# Patient Record
Sex: MALE | Race: OTHER | HISPANIC OR LATINO | URBAN - METROPOLITAN AREA
[De-identification: names, ages, dates, MRNs, and addresses within clinical notes are randomized per-mention and may not be internally consistent; named-entity substitution may affect disease eponyms.]

---

## 2021-01-20 ENCOUNTER — INPATIENT (INPATIENT)
Facility: HOSPITAL | Age: 71
LOS: 5 days | Discharge: HOME HEALTH | End: 2021-01-26
Attending: INTERNAL MEDICINE | Admitting: INTERNAL MEDICINE
Payer: MEDICAID

## 2021-01-20 VITALS
TEMPERATURE: 99 F | DIASTOLIC BLOOD PRESSURE: 71 MMHG | HEART RATE: 77 BPM | SYSTOLIC BLOOD PRESSURE: 130 MMHG | OXYGEN SATURATION: 96 % | RESPIRATION RATE: 24 BRPM

## 2021-01-20 DIAGNOSIS — Z29.9 ENCOUNTER FOR PROPHYLACTIC MEASURES, UNSPECIFIED: ICD-10-CM

## 2021-01-20 DIAGNOSIS — U07.1 COVID-19: ICD-10-CM

## 2021-01-20 DIAGNOSIS — R03.0 ELEVATED BLOOD-PRESSURE READING, WITHOUT DIAGNOSIS OF HYPERTENSION: ICD-10-CM

## 2021-01-20 DIAGNOSIS — R74.01 ELEVATION OF LEVELS OF LIVER TRANSAMINASE LEVELS: ICD-10-CM

## 2021-01-20 LAB
ALBUMIN SERPL ELPH-MCNC: 3 G/DL — LOW (ref 3.3–5)
ALP SERPL-CCNC: 236 U/L — HIGH (ref 40–120)
ALT FLD-CCNC: 73 U/L — HIGH (ref 4–41)
ANION GAP SERPL CALC-SCNC: 13 MMOL/L — SIGNIFICANT CHANGE UP (ref 7–14)
AST SERPL-CCNC: 58 U/L — HIGH (ref 4–40)
BASOPHILS # BLD AUTO: 0.02 K/UL — SIGNIFICANT CHANGE UP (ref 0–0.2)
BASOPHILS NFR BLD AUTO: 0.2 % — SIGNIFICANT CHANGE UP (ref 0–2)
BILIRUB SERPL-MCNC: 0.6 MG/DL — SIGNIFICANT CHANGE UP (ref 0.2–1.2)
BLOOD GAS VENOUS COMPREHENSIVE RESULT: SIGNIFICANT CHANGE UP
BUN SERPL-MCNC: 16 MG/DL — SIGNIFICANT CHANGE UP (ref 7–23)
CALCIUM SERPL-MCNC: 8.6 MG/DL — SIGNIFICANT CHANGE UP (ref 8.4–10.5)
CHLORIDE SERPL-SCNC: 103 MMOL/L — SIGNIFICANT CHANGE UP (ref 98–107)
CO2 SERPL-SCNC: 21 MMOL/L — LOW (ref 22–31)
CREAT SERPL-MCNC: 1 MG/DL — SIGNIFICANT CHANGE UP (ref 0.5–1.3)
CRP SERPL-MCNC: 184.3 MG/L — HIGH
D DIMER BLD IA.RAPID-MCNC: 757 NG/ML DDU — HIGH
EOSINOPHIL # BLD AUTO: 0.03 K/UL — SIGNIFICANT CHANGE UP (ref 0–0.5)
EOSINOPHIL NFR BLD AUTO: 0.3 % — SIGNIFICANT CHANGE UP (ref 0–6)
FERRITIN SERPL-MCNC: 1076 NG/ML — HIGH (ref 30–400)
GLUCOSE SERPL-MCNC: 111 MG/DL — HIGH (ref 70–99)
HCT VFR BLD CALC: 38.1 % — LOW (ref 39–50)
HGB BLD-MCNC: 12.7 G/DL — LOW (ref 13–17)
IANC: 8.52 K/UL — HIGH (ref 1.5–8.5)
IMM GRANULOCYTES NFR BLD AUTO: 2 % — HIGH (ref 0–1.5)
LYMPHOCYTES # BLD AUTO: 0.66 K/UL — LOW (ref 1–3.3)
LYMPHOCYTES # BLD AUTO: 6.8 % — LOW (ref 13–44)
MCHC RBC-ENTMCNC: 31 PG — SIGNIFICANT CHANGE UP (ref 27–34)
MCHC RBC-ENTMCNC: 33.3 GM/DL — SIGNIFICANT CHANGE UP (ref 32–36)
MCV RBC AUTO: 92.9 FL — SIGNIFICANT CHANGE UP (ref 80–100)
MONOCYTES # BLD AUTO: 0.32 K/UL — SIGNIFICANT CHANGE UP (ref 0–0.9)
MONOCYTES NFR BLD AUTO: 3.3 % — SIGNIFICANT CHANGE UP (ref 2–14)
NEUTROPHILS # BLD AUTO: 8.52 K/UL — HIGH (ref 1.8–7.4)
NEUTROPHILS NFR BLD AUTO: 87.4 % — HIGH (ref 43–77)
NRBC # BLD: 0 /100 WBCS — SIGNIFICANT CHANGE UP
NRBC # FLD: 0 K/UL — SIGNIFICANT CHANGE UP
PLATELET # BLD AUTO: 365 K/UL — SIGNIFICANT CHANGE UP (ref 150–400)
POTASSIUM SERPL-MCNC: 4 MMOL/L — SIGNIFICANT CHANGE UP (ref 3.5–5.3)
POTASSIUM SERPL-SCNC: 4 MMOL/L — SIGNIFICANT CHANGE UP (ref 3.5–5.3)
PROCALCITONIN SERPL-MCNC: 0.45 NG/ML — HIGH (ref 0.02–0.1)
PROT SERPL-MCNC: 6.8 G/DL — SIGNIFICANT CHANGE UP (ref 6–8.3)
RBC # BLD: 4.1 M/UL — LOW (ref 4.2–5.8)
RBC # FLD: 13.9 % — SIGNIFICANT CHANGE UP (ref 10.3–14.5)
SARS-COV-2 RNA SPEC QL NAA+PROBE: DETECTED
SODIUM SERPL-SCNC: 137 MMOL/L — SIGNIFICANT CHANGE UP (ref 135–145)
TROPONIN T, HIGH SENSITIVITY RESULT: 11 NG/L — SIGNIFICANT CHANGE UP
WBC # BLD: 9.74 K/UL — SIGNIFICANT CHANGE UP (ref 3.8–10.5)
WBC # FLD AUTO: 9.74 K/UL — SIGNIFICANT CHANGE UP (ref 3.8–10.5)

## 2021-01-20 PROCEDURE — 99285 EMERGENCY DEPT VISIT HI MDM: CPT

## 2021-01-20 PROCEDURE — 99223 1ST HOSP IP/OBS HIGH 75: CPT

## 2021-01-20 PROCEDURE — 71045 X-RAY EXAM CHEST 1 VIEW: CPT | Mod: 26

## 2021-01-20 RX ORDER — DEXAMETHASONE 0.5 MG/5ML
6 ELIXIR ORAL ONCE
Refills: 0 | Status: COMPLETED | OUTPATIENT
Start: 2021-01-20 | End: 2021-01-20

## 2021-01-20 RX ORDER — REMDESIVIR 5 MG/ML
100 INJECTION INTRAVENOUS EVERY 24 HOURS
Refills: 0 | Status: COMPLETED | OUTPATIENT
Start: 2021-01-21 | End: 2021-01-24

## 2021-01-20 RX ORDER — ACETAMINOPHEN 500 MG
650 TABLET ORAL EVERY 4 HOURS
Refills: 0 | Status: DISCONTINUED | OUTPATIENT
Start: 2021-01-20 | End: 2021-01-26

## 2021-01-20 RX ORDER — ASPIRIN/CALCIUM CARB/MAGNESIUM 324 MG
1 TABLET ORAL
Qty: 0 | Refills: 0 | DISCHARGE

## 2021-01-20 RX ORDER — REMDESIVIR 5 MG/ML
INJECTION INTRAVENOUS
Refills: 0 | Status: COMPLETED | OUTPATIENT
Start: 2021-01-20 | End: 2021-01-24

## 2021-01-20 RX ORDER — ENOXAPARIN SODIUM 100 MG/ML
40 INJECTION SUBCUTANEOUS DAILY
Refills: 0 | Status: DISCONTINUED | OUTPATIENT
Start: 2021-01-20 | End: 2021-01-26

## 2021-01-20 RX ORDER — INFLUENZA VIRUS VACCINE 15; 15; 15; 15 UG/.5ML; UG/.5ML; UG/.5ML; UG/.5ML
0.5 SUSPENSION INTRAMUSCULAR ONCE
Refills: 0 | Status: DISCONTINUED | OUTPATIENT
Start: 2021-01-20 | End: 2021-01-21

## 2021-01-20 RX ORDER — PANTOPRAZOLE SODIUM 20 MG/1
40 TABLET, DELAYED RELEASE ORAL
Refills: 0 | Status: DISCONTINUED | OUTPATIENT
Start: 2021-01-20 | End: 2021-01-26

## 2021-01-20 RX ORDER — REMDESIVIR 5 MG/ML
200 INJECTION INTRAVENOUS EVERY 24 HOURS
Refills: 0 | Status: COMPLETED | OUTPATIENT
Start: 2021-01-20 | End: 2021-01-20

## 2021-01-20 RX ORDER — ASPIRIN/CALCIUM CARB/MAGNESIUM 324 MG
81 TABLET ORAL DAILY
Refills: 0 | Status: DISCONTINUED | OUTPATIENT
Start: 2021-01-20 | End: 2021-01-26

## 2021-01-20 RX ORDER — DEXAMETHASONE 0.5 MG/5ML
6 ELIXIR ORAL DAILY
Refills: 0 | Status: DISCONTINUED | OUTPATIENT
Start: 2021-01-21 | End: 2021-01-26

## 2021-01-20 RX ADMIN — ENOXAPARIN SODIUM 40 MILLIGRAM(S): 100 INJECTION SUBCUTANEOUS at 13:52

## 2021-01-20 RX ADMIN — REMDESIVIR 500 MILLIGRAM(S): 5 INJECTION INTRAVENOUS at 13:53

## 2021-01-20 RX ADMIN — Medication 81 MILLIGRAM(S): at 13:52

## 2021-01-20 RX ADMIN — Medication 6 MILLIGRAM(S): at 02:45

## 2021-01-20 NOTE — H&P ADULT - PROBLEM SELECTOR PLAN 1
- currently 97% on 4L NC  - decadron 6 mg IV daily x 10 days  - Remdesivir  x 5 days  - monitor clinical response, respiratory status  - supplemental O2 prn, wean as tolerated  - tylenol prn

## 2021-01-20 NOTE — H&P ADULT - NSHPREVIEWOFSYSTEMS_GEN_ALL_CORE
REVIEW OF SYSTEMS  General:	  Skin/Breast:	  Ophthalmologic:	  ENMT:	  Respiratory and Thorax:	  Cardiovascular:	  Gastrointestinal:	  Genitourinary:	  Musculoskeletal:	  Neurological:	  Psychiatric:	  Hematology/Lymphatics:	  Endocrine:	  Allergic/Immunologic: REVIEW OF SYSTEMS  General:	denies current fever/chills, +fatigue, +poor appetite (only taking in liquids at home)  Skin/Breast: no rash/lesions	  Ophthalmologic: no blurry vision/eye pain	  ENMT: +dry lips, no trouble swallowing	  Respiratory and Thorax: reports SOB improved after being placed on O2	  Cardiovascular: no chest pain, no lower extremity edema	  Gastrointestinal: no nausea/vomiting, no constipation/diarrhea, reports occasional burning pain in LUQ which he attributes to not eating   Genitourinary: no dysuria/incontinence/hesitancy	  Musculoskeletal: no joint pain or swelling  Neurological: no headache/focal weakness or loss of sensation	  Psychiatric: no issues	  Hematology/Lymphatics: no easy bleeding/bruising	  Endocrine: no diabetes or thyroid disorder	  Allergic/Immunologic: NKDA

## 2021-01-20 NOTE — ED PROVIDER NOTE - CLINICAL SUMMARY MEDICAL DECISION MAKING FREE TEXT BOX
71 yo male with recent COVID-19 presenting for worsening shortness of breath. Will get COVID-19 labs and Reassess. Likely need to be admitted for hypoxia.

## 2021-01-20 NOTE — H&P ADULT - PROBLEM SELECTOR PLAN 2
- monitor LFTs on Remdesivir  - if having abdominal pain, can consider RUQ sono for further eval - monitor LFTs on Remdesivir  - if having abdominal pain, can consider RUQ sono for further eval given elevated alk phos (though currently denies RUQ tenderness on exam)

## 2021-01-20 NOTE — ED ADULT NURSE NOTE - CHIEF COMPLAINT QUOTE
Pt c/o diagnosed with covid approx 10 days ago, still feeling SOB and O2 sat at home was in the 80's.  Pt 91% on RA, placed on 3L O2 via NC and now O2 sat is 96%.  Pt states feels better with O2.  Denies any chest pain/cough presently.  Denies any PMHx.    Addendum 0200:  Please call daughter for any procedures that need to be done as she is healthcare proxy.

## 2021-01-20 NOTE — H&P ADULT - NSHPSOCIALHISTORY_GEN_ALL_CORE
Lives with daughter and family, no issues with ambulation  Social alcohol use  Denies tobacco Lives with daughter and family, no issues with ambulation  Stays active, says he bicycles and does "gymnastics"  Social alcohol use  Denies tobacco

## 2021-01-20 NOTE — ED PROVIDER NOTE - ATTENDING CONTRIBUTION TO CARE
Afebrile. Awake and Alert. Lungs CTA. Heart RRR. Abdomen soft NTND. CN II-XII grossly intact. Moves all extremities without lateralization.    COVID + 8 days  91-92% on RA and tachypnic, corrects to 96% on 4L NC

## 2021-01-20 NOTE — H&P ADULT - PROBLEM SELECTOR PLAN 4
DVT ppx: lovenox DVT ppx: lovenox    GI: Reports burning epigastric LUQ pain, could consider trial of PPI  Nutrition: reports decreased PO intake setting of infection, could obtain nutrition eval, encourage PO intake, diet as tolerated

## 2021-01-20 NOTE — H&P ADULT - NSHPLABSRESULTS_GEN_ALL_CORE
Labs reviewed personally.                          12.7   9.74  )-----------( 365      ( 20 Jan 2021 03:33 )             38.1     Hgb Trend: 12.7<--  01-20    137  |  103  |  16  ----------------------------<  111<H>  4.0   |  21<L>  |  1.00    Ca    8.6      20 Jan 2021 03:33    TPro  6.8  /  Alb  3.0<L>  /  TBili  0.6  /  DBili  x   /  AST  58<H>  /  ALT  73<H>  /  AlkPhos  236<H>  01-20    Creatinine Trend: 1.00<--        Imaging reviewed personally.  < from: Xray Chest 1 View AP/PA (01.20.21 @ 03:15) >    FINDINGS:  Bilateral coarse interstitial markings nonspecific but consistent with covid 19 infection in the presence of symptoms and positive covid 19 test.  No pleural effusion. No pneumothorax.  Cardiac size cannot accurately be assessed in this projection.  No acute osseous abnormality.      IMPRESSION: Bilateral patchy opacities, compatible with COVID19.    < end of copied text > Labs reviewed personally.                          12.7   9.74  )-----------( 365      ( 20 Jan 2021 03:33 )             38.1     Hgb Trend: 12.7<--  01-20    137  |  103  |  16  ----------------------------<  111<H>  4.0   |  21<L>  |  1.00    Ca    8.6      20 Jan 2021 03:33    TPro  6.8  /  Alb  3.0<L>  /  TBili  0.6  /  DBili  x   /  AST  58<H>  /  ALT  73<H>  /  AlkPhos  236<H>  01-20    Creatinine Trend: 1.00<--    Imaging reviewed personally.  < from: Xray Chest 1 View AP/PA (01.20.21 @ 03:15) >    FINDINGS:  Bilateral coarse interstitial markings nonspecific but consistent with covid 19 infection in the presence of symptoms and positive covid 19 test.  No pleural effusion. No pneumothorax.  Cardiac size cannot accurately be assessed in this projection.  No acute osseous abnormality.      IMPRESSION: Bilateral patchy opacities, compatible with COVID19.    < end of copied text >

## 2021-01-20 NOTE — ED PROVIDER NOTE - PHYSICAL EXAMINATION
PHYSICAL EXAM:  GENERAL: resting comfortably in bed, on 1 L nasal cannula  CHEST/LUNG: Clear to auscultation bilaterally; No wheeze  HEART: Regular rate and rhythm; No murmurs, rubs, or gallops  ABDOMEN: Soft, Nontender, Nondistended; Bowel sounds present  EXTREMITIES:  2+ Peripheral Pulses, No clubbing, cyanosis, or edema  SKIN: No rashes or lesions

## 2021-01-20 NOTE — H&P ADULT - HISTORY OF PRESENT ILLNESS
71 yo M w/ no significant medical/surgical history (described as healthy, only taking asa), presenting with worsening SOB x1 day, with fatigue and decreased PO intake/appetite x 5-6 days in setting of testing positive for COVID-19 on 1/10 (~10 days ago). Per daughter Kinjal, multiple family members including grandkids sick with Covid, but overall they are doing well (some lost sense of taste/smell). Patient was taking some Tylenol at home to help manage fever/aches.  Was noted to be hypoxic to the 80s at home.     Daughter reports patient is around 180 lbs, height 5 foot 6 inches (BMI 29).     ED Vitals: 37.3C HR 77 /71 RR 24 O2 91% RA -> 96% on 3L NC  Received decadron 6 mg x1.

## 2021-01-20 NOTE — H&P ADULT - ASSESSMENT
69 yo F presenting with acute hypoxic respiratory failure (O2 sats in 80s per ED triage note) found to be positive for COVID-19 (PCR positive, CXR with bilateral patchy opacities compatible with COVID infection).

## 2021-01-20 NOTE — ED ADULT TRIAGE NOTE - CHIEF COMPLAINT QUOTE
Pt c/o diagnosed with covid approx 10 days ago, still feeling SOB and O2 sat at home was in the 80's.  Pt 91% on RA, placed on 3L O2 via NC and now O2 sat is 96%.  Pt states feels better with O2.  Denies any chest pain/cough presently.  Denies any PMHx. Pt c/o diagnosed with covid approx 10 days ago, still feeling SOB and O2 sat at home was in the 80's.  Pt 91% on RA, placed on 3L O2 via NC and now O2 sat is 96%.  Pt states feels better with O2.  Denies any chest pain/cough presently.  Denies any PMHx.    Addendum 0200:  Please call daughter for any procedures that need to be done as she is healthcare proxy.

## 2021-01-20 NOTE — H&P ADULT - NSHPPHYSICALEXAM_GEN_ALL_CORE
Vital Signs Last 24 Hrs  T(C): 36.8 (20 Jan 2021 08:40), Max: 37.3 (20 Jan 2021 01:29)  T(F): 98.3 (20 Jan 2021 08:40), Max: 99.2 (20 Jan 2021 01:29)  HR: 55 (20 Jan 2021 08:40) (55 - 77)  BP: 152/86 (20 Jan 2021 08:40) (130/71 - 152/86)  BP(mean): --  RR: 16 (20 Jan 2021 08:40) (16 - 24)  SpO2: 97% (20 Jan 2021 08:40) (96% - 99%)    GEN:   HEAD:  EYES:  ENMT:   PULM:   CV:   Abdominal:   Extremities:   NEURO:   PSYCH:  SKIN:  MSK: Vital Signs Last 24 Hrs  T(C): 36.8 (20 Jan 2021 08:40), Max: 37.3 (20 Jan 2021 01:29)  T(F): 98.3 (20 Jan 2021 08:40), Max: 99.2 (20 Jan 2021 01:29)  HR: 55 (20 Jan 2021 08:40) (55 - 77)  BP: 152/86 (20 Jan 2021 08:40) (130/71 - 152/86)  BP(mean): --  RR: 16 (20 Jan 2021 08:40) (16 - 24)  SpO2: 97% (20 Jan 2021 08:40) (96% - 99%)    GEN: NAD, lying in bed, well developed  HEAD: NC/AT  EYES: EOMI, clear sclera/conjunctiva, vision grossly intact  ENMT: slightly dry lips, hearing intact to voice  NECK: supple, no JVD  PULM: no respiratory distress on supplemental O2 via NC, appears to be able to speak in full sentences (Urdu)  CV: S1S2 RRR  Abdominal: soft, nontender to palpation, no rebound, no guarding  Extremities: no c/c/e,   NEURO: moving all extremities, SILT grossly, non-focal exam, awake/conversant  PSYCH: calm, pleasant  SKIN: no obvious rashes/lesions  MSK: no joint tenderness, no calf tenderness

## 2021-01-20 NOTE — ED ADULT NURSE NOTE - OBJECTIVE STATEMENT
Patient received to room 7, A&OX4, ambulatory, Turkish speaking only (called daughter to translate), c/o worsening COVID symptoms. States he tested positive for COVID approx. 10 days ago. Since, has been having worsening SOB, cough, weakness, loss of appetite. Currently 91% on RA, placed on 4L NC, 96%. Respirations even and mildly labored. Denies CP/HA/fevers. Afebrile at present. 18 G IV placed to R AC, labs drawn and sent. MD at bedside. VS as noted. Medicated as per orders.

## 2021-01-20 NOTE — H&P ADULT - PROBLEM SELECTOR PLAN 3
- /86 in most recent BP reading, prior SBP 130s  - will continue to monitor, goal BP <150 given age, will hold off on starting BP meds at this time

## 2021-01-21 LAB
ANION GAP SERPL CALC-SCNC: 13 MMOL/L — SIGNIFICANT CHANGE UP (ref 7–14)
BUN SERPL-MCNC: 21 MG/DL — SIGNIFICANT CHANGE UP (ref 7–23)
CALCIUM SERPL-MCNC: 8.6 MG/DL — SIGNIFICANT CHANGE UP (ref 8.4–10.5)
CHLORIDE SERPL-SCNC: 104 MMOL/L — SIGNIFICANT CHANGE UP (ref 98–107)
CO2 SERPL-SCNC: 21 MMOL/L — LOW (ref 22–31)
CREAT SERPL-MCNC: 0.86 MG/DL — SIGNIFICANT CHANGE UP (ref 0.5–1.3)
GLUCOSE SERPL-MCNC: 101 MG/DL — HIGH (ref 70–99)
HCT VFR BLD CALC: 38.2 % — LOW (ref 39–50)
HCV AB S/CO SERPL IA: 0.11 S/CO — SIGNIFICANT CHANGE UP (ref 0–0.99)
HCV AB SERPL-IMP: SIGNIFICANT CHANGE UP
HGB BLD-MCNC: 12.7 G/DL — LOW (ref 13–17)
MAGNESIUM SERPL-MCNC: 2.5 MG/DL — SIGNIFICANT CHANGE UP (ref 1.6–2.6)
MCHC RBC-ENTMCNC: 31.1 PG — SIGNIFICANT CHANGE UP (ref 27–34)
MCHC RBC-ENTMCNC: 33.2 GM/DL — SIGNIFICANT CHANGE UP (ref 32–36)
MCV RBC AUTO: 93.6 FL — SIGNIFICANT CHANGE UP (ref 80–100)
NRBC # BLD: 0 /100 WBCS — SIGNIFICANT CHANGE UP
NRBC # FLD: 0 K/UL — SIGNIFICANT CHANGE UP
PHOSPHATE SERPL-MCNC: 3.1 MG/DL — SIGNIFICANT CHANGE UP (ref 2.5–4.5)
PLATELET # BLD AUTO: 449 K/UL — HIGH (ref 150–400)
POTASSIUM SERPL-MCNC: 4.1 MMOL/L — SIGNIFICANT CHANGE UP (ref 3.5–5.3)
POTASSIUM SERPL-SCNC: 4.1 MMOL/L — SIGNIFICANT CHANGE UP (ref 3.5–5.3)
RBC # BLD: 4.08 M/UL — LOW (ref 4.2–5.8)
RBC # FLD: 14 % — SIGNIFICANT CHANGE UP (ref 10.3–14.5)
SODIUM SERPL-SCNC: 138 MMOL/L — SIGNIFICANT CHANGE UP (ref 135–145)
WBC # BLD: 13.04 K/UL — HIGH (ref 3.8–10.5)
WBC # FLD AUTO: 13.04 K/UL — HIGH (ref 3.8–10.5)

## 2021-01-21 PROCEDURE — 99233 SBSQ HOSP IP/OBS HIGH 50: CPT

## 2021-01-21 RX ORDER — INFLUENZA VIRUS VACCINE 15; 15; 15; 15 UG/.5ML; UG/.5ML; UG/.5ML; UG/.5ML
0.7 SUSPENSION INTRAMUSCULAR ONCE
Refills: 0 | Status: DISCONTINUED | OUTPATIENT
Start: 2021-01-21 | End: 2021-01-26

## 2021-01-21 RX ADMIN — Medication 6 MILLIGRAM(S): at 06:48

## 2021-01-21 RX ADMIN — PANTOPRAZOLE SODIUM 40 MILLIGRAM(S): 20 TABLET, DELAYED RELEASE ORAL at 06:48

## 2021-01-21 RX ADMIN — REMDESIVIR 500 MILLIGRAM(S): 5 INJECTION INTRAVENOUS at 12:02

## 2021-01-21 RX ADMIN — Medication 81 MILLIGRAM(S): at 12:02

## 2021-01-21 RX ADMIN — ENOXAPARIN SODIUM 40 MILLIGRAM(S): 100 INJECTION SUBCUTANEOUS at 12:02

## 2021-01-21 NOTE — PROGRESS NOTE ADULT - PROBLEM SELECTOR PLAN 1
- currently 93% on 5 lt NC  - decadron 6 mg IV daily x 10 days  - Remdesivir  x 5 days  - monitor clinical response, respiratory status  - supplemental O2 prn, wean as tolerated  - tylenol prn

## 2021-01-21 NOTE — PROGRESS NOTE ADULT - PROBLEM SELECTOR PLAN 3
- BP improved to 130's/75 in most recent BP reading,   - will continue to monitor, goal BP <150 given age, will hold off on starting BP meds at this time

## 2021-01-21 NOTE — PROGRESS NOTE ADULT - SUBJECTIVE AND OBJECTIVE BOX
Medicine Progress Note    Patient is a 70y old  Male who presents with a chief complaint of COVID19 (20 Jan 2021 09:13)      SUBJECTIVE / OVERNIGHT EVENTS:  Pt seen and examined at bedside.   Pt on 5lt NC saturating 93%  Pt denies any complaints.     ADDITIONAL REVIEW OF SYSTEMS: neg except above    MEDICATIONS  (STANDING):  aspirin enteric coated 81 milliGRAM(s) Oral daily  dexAMETHasone  Injectable 6 milliGRAM(s) IV Push daily  enoxaparin Injectable 40 milliGRAM(s) SubCutaneous daily  influenza  Vaccine (HIGH DOSE) 0.7 milliLiter(s) IntraMuscular once  pantoprazole    Tablet 40 milliGRAM(s) Oral before breakfast  remdesivir  IVPB 100 milliGRAM(s) IV Intermittent every 24 hours  remdesivir  IVPB   IV Intermittent     MEDICATIONS  (PRN):  acetaminophen   Tablet .. 650 milliGRAM(s) Oral every 4 hours PRN Temp greater or equal to 38.5C (101.3F)  acetaminophen  Suppository .. 650 milliGRAM(s) Rectal every 4 hours PRN Temp greater or equal to 38.5C (101.3F)      20 Jan 2021 07:01  -  21 Jan 2021 07:00  --------------------------------------------------------  IN: 0 mL / OUT: 500 mL / NET: -500 mL        PHYSICAL EXAM:  Vital Signs Last 24 Hrs  T(C): 36.6 (21 Jan 2021 11:47), Max: 36.8 (20 Jan 2021 22:33)  T(F): 97.9 (21 Jan 2021 11:47), Max: 98.2 (20 Jan 2021 22:33)  HR: 75 (21 Jan 2021 11:47) (62 - 75)  BP: 125/63 (21 Jan 2021 11:47) (125/63 - 131/75)  BP(mean): --  RR: 20 (21 Jan 2021 11:47) (20 - 20)  SpO2: 92% (21 Jan 2021 11:47) (91% - 92%)  CONSTITUTIONAL: NAD, well-developed, well-groomed  ENMT: Moist oral mucosa, no pharyngeal injection or exudates; normal dentition  RESPIRATORY: decreased breath sounds bilaterally, on 5lt NC  CARDIOVASCULAR: Regular rate and rhythm, normal S1 and S2, no murmur/rub/gallop; No lower extremity edema; Peripheral pulses are 2+ bilaterally  ABDOMEN: Nontender to palpation, normoactive bowel sounds, no rebound/guarding; No hepatosplenomegaly  PSYCH: A+O to person, place, and time; affect appropriate  NEUROLOGY: CN 2-12 are intact and symmetric; no gross sensory deficits   SKIN: No rashes; no palpable lesions    LABS:                        12.7   13.04 )-----------( 449      ( 21 Jan 2021 07:28 )             38.2     01-21    138  |  104  |  21  ----------------------------<  101<H>  4.1   |  21<L>  |  0.86    Ca    8.6      21 Jan 2021 07:28  Phos  3.1     01-21  Mg     2.5     01-21    TPro  6.8  /  Alb  3.0<L>  /  TBili  0.6  /  DBili  x   /  AST  58<H>  /  ALT  73<H>  /  AlkPhos  236<H>  01-20      COVID-19 PCR: Detected (20 Jan 2021 03:56)      RADIOLOGY & ADDITIONAL TESTS:  Imaging from Last 24 Hours: Bilateral patchy opacities.

## 2021-01-21 NOTE — DIETITIAN INITIAL EVALUATION ADULT. - OTHER INFO
Pt 69 yo male with acute hypoxic respiratory failure found to be positive for COVID-19 - per chart review.     Unable to conduct a face to face interview or nutrition-focused physical exam due to limited contact restrictions related to Pt's medical condition and isolation precautions. RDN spoke to Pt's daughter (Kinjal [phone]) over the phone. Per daughter Pt eats well now, but PTA Pt's appetite was not well 2/2 his sickness. Daughter also stated Pt lost weight ~10# PTA. Food preferences discussed; will recommend to add PO supplement: Ensure - 2x daily to diet rx.   Per daughter Pt's weight: ~175#, now; Pt's UBW: 185#. No report of chewing or swallowing difficulties; no report of GI distress (nausea/vomiting/diarrhea) @ present.

## 2021-01-21 NOTE — DIETITIAN INITIAL EVALUATION ADULT. - PERTINENT LABORATORY DATA
(1/21/21) WBC 13.04 H, H/H 12.7/38.7 H,  H, Glu 101 H;             (1/20/21) Albumin 3.0 L, AST 58 H,  H, ALT 73 H

## 2021-01-21 NOTE — PROGRESS NOTE ADULT - PROBLEM SELECTOR PLAN 2
- monitor LFTs on Remdesivir  - if having abdominal pain, can consider RUQ sono for further eval given elevated alk phos (though currently denies RUQ tenderness on exam)

## 2021-01-22 LAB
ALBUMIN SERPL ELPH-MCNC: 2.8 G/DL — LOW (ref 3.3–5)
ALBUMIN SERPL ELPH-MCNC: 2.9 G/DL — LOW (ref 3.3–5)
ALP SERPL-CCNC: 182 U/L — HIGH (ref 40–120)
ALP SERPL-CCNC: 183 U/L — HIGH (ref 40–120)
ALT FLD-CCNC: 63 U/L — HIGH (ref 4–41)
ALT FLD-CCNC: 64 U/L — HIGH (ref 4–41)
ANION GAP SERPL CALC-SCNC: 13 MMOL/L — SIGNIFICANT CHANGE UP (ref 7–14)
AST SERPL-CCNC: 33 U/L — SIGNIFICANT CHANGE UP (ref 4–40)
AST SERPL-CCNC: 34 U/L — SIGNIFICANT CHANGE UP (ref 4–40)
BILIRUB DIRECT SERPL-MCNC: <0.2 MG/DL — SIGNIFICANT CHANGE UP (ref 0–0.2)
BILIRUB INDIRECT FLD-MCNC: >0.2 MG/DL — SIGNIFICANT CHANGE UP (ref 0–1)
BILIRUB SERPL-MCNC: 0.4 MG/DL — SIGNIFICANT CHANGE UP (ref 0.2–1.2)
BILIRUB SERPL-MCNC: 0.4 MG/DL — SIGNIFICANT CHANGE UP (ref 0.2–1.2)
BUN SERPL-MCNC: 23 MG/DL — SIGNIFICANT CHANGE UP (ref 7–23)
CALCIUM SERPL-MCNC: 8.4 MG/DL — SIGNIFICANT CHANGE UP (ref 8.4–10.5)
CHLORIDE SERPL-SCNC: 104 MMOL/L — SIGNIFICANT CHANGE UP (ref 98–107)
CO2 SERPL-SCNC: 21 MMOL/L — LOW (ref 22–31)
CREAT SERPL-MCNC: 0.82 MG/DL — SIGNIFICANT CHANGE UP (ref 0.5–1.3)
CREAT SERPL-MCNC: 0.83 MG/DL — SIGNIFICANT CHANGE UP (ref 0.5–1.3)
CRP SERPL-MCNC: 54.4 MG/L — HIGH
D DIMER BLD IA.RAPID-MCNC: 726 NG/ML DDU — HIGH
FERRITIN SERPL-MCNC: 895 NG/ML — HIGH (ref 30–400)
GLUCOSE SERPL-MCNC: 85 MG/DL — SIGNIFICANT CHANGE UP (ref 70–99)
HCT VFR BLD CALC: 37.5 % — LOW (ref 39–50)
HGB BLD-MCNC: 12.6 G/DL — LOW (ref 13–17)
LDH SERPL L TO P-CCNC: 334 U/L — HIGH (ref 135–225)
MAGNESIUM SERPL-MCNC: 2.3 MG/DL — SIGNIFICANT CHANGE UP (ref 1.6–2.6)
MCHC RBC-ENTMCNC: 31.4 PG — SIGNIFICANT CHANGE UP (ref 27–34)
MCHC RBC-ENTMCNC: 33.6 GM/DL — SIGNIFICANT CHANGE UP (ref 32–36)
MCV RBC AUTO: 93.5 FL — SIGNIFICANT CHANGE UP (ref 80–100)
NRBC # BLD: 0 /100 WBCS — SIGNIFICANT CHANGE UP
NRBC # FLD: 0 K/UL — SIGNIFICANT CHANGE UP
PLATELET # BLD AUTO: 507 K/UL — HIGH (ref 150–400)
POTASSIUM SERPL-MCNC: 4.1 MMOL/L — SIGNIFICANT CHANGE UP (ref 3.5–5.3)
POTASSIUM SERPL-SCNC: 4.1 MMOL/L — SIGNIFICANT CHANGE UP (ref 3.5–5.3)
PROT SERPL-MCNC: 6.4 G/DL — SIGNIFICANT CHANGE UP (ref 6–8.3)
PROT SERPL-MCNC: 6.5 G/DL — SIGNIFICANT CHANGE UP (ref 6–8.3)
RBC # BLD: 4.01 M/UL — LOW (ref 4.2–5.8)
RBC # FLD: 14 % — SIGNIFICANT CHANGE UP (ref 10.3–14.5)
SODIUM SERPL-SCNC: 138 MMOL/L — SIGNIFICANT CHANGE UP (ref 135–145)
WBC # BLD: 11.72 K/UL — HIGH (ref 3.8–10.5)
WBC # FLD AUTO: 11.72 K/UL — HIGH (ref 3.8–10.5)

## 2021-01-22 PROCEDURE — 99232 SBSQ HOSP IP/OBS MODERATE 35: CPT

## 2021-01-22 RX ADMIN — Medication 81 MILLIGRAM(S): at 12:12

## 2021-01-22 RX ADMIN — REMDESIVIR 500 MILLIGRAM(S): 5 INJECTION INTRAVENOUS at 12:12

## 2021-01-22 RX ADMIN — ENOXAPARIN SODIUM 40 MILLIGRAM(S): 100 INJECTION SUBCUTANEOUS at 12:12

## 2021-01-22 RX ADMIN — PANTOPRAZOLE SODIUM 40 MILLIGRAM(S): 20 TABLET, DELAYED RELEASE ORAL at 06:35

## 2021-01-22 RX ADMIN — Medication 6 MILLIGRAM(S): at 06:35

## 2021-01-22 NOTE — PROGRESS NOTE ADULT - SUBJECTIVE AND OBJECTIVE BOX
Medicine Progress Note    Patient is a 70y old  Male who presents with a chief complaint of COVID19 (21 Jan 2021 12:39)      SUBJECTIVE / OVERNIGHT EVENTS:  Pt seen an and examined at bedside.   Pt denies any complaints.   Pt on 2lt NC saturating 94%    ADDITIONAL REVIEW OF SYSTEMS: neg except above    MEDICATIONS  (STANDING):  aspirin enteric coated 81 milliGRAM(s) Oral daily  dexAMETHasone  Injectable 6 milliGRAM(s) IV Push daily  enoxaparin Injectable 40 milliGRAM(s) SubCutaneous daily  influenza  Vaccine (HIGH DOSE) 0.7 milliLiter(s) IntraMuscular once  pantoprazole    Tablet 40 milliGRAM(s) Oral before breakfast  remdesivir  IVPB 100 milliGRAM(s) IV Intermittent every 24 hours  remdesivir  IVPB   IV Intermittent     MEDICATIONS  (PRN):  acetaminophen   Tablet .. 650 milliGRAM(s) Oral every 4 hours PRN Temp greater or equal to 38.5C (101.3F)  acetaminophen  Suppository .. 650 milliGRAM(s) Rectal every 4 hours PRN Temp greater or equal to 38.5C (101.3F)    CAPILLARY BLOOD GLUCOSE        I&O's Summary    22 Jan 2021 07:01  -  22 Jan 2021 12:43  --------------------------------------------------------  IN: 600 mL / OUT: 250 mL / NET: 350 mL        PHYSICAL EXAM:  Vital Signs Last 24 Hrs  T(C): 36.6 (22 Jan 2021 10:15), Max: 36.6 (22 Jan 2021 10:15)  T(F): 97.8 (22 Jan 2021 10:15), Max: 97.8 (22 Jan 2021 10:15)  HR: 61 (22 Jan 2021 10:15) (54 - 61)  BP: 115/64 (22 Jan 2021 10:15) (115/64 - 137/80)  BP(mean): --  RR: 18 (22 Jan 2021 10:15) (18 - 18)  SpO2: 95% (22 Jan 2021 10:15) (93% - 95%)  CONSTITUTIONAL: NAD, well-developed, well-groomed  ENMT: Moist oral mucosa, no pharyngeal injection or exudates; normal dentition  RESPIRATORY: Normal respiratory effort; lungs are clear to auscultation bilaterally  CARDIOVASCULAR: Regular rate and rhythm, normal S1 and S2, no murmur/rub/gallop; No lower extremity edema; Peripheral pulses are 2+ bilaterally  ABDOMEN: Nontender to palpation, normoactive bowel sounds, no rebound/guarding; No hepatosplenomegaly  PSYCH: A+O to person, place, and time; affect appropriate  NEUROLOGY: CN 2-12 are intact and symmetric; no gross sensory deficits   SKIN: No rashes; no palpable lesions    LABS:                        12.6   11.72 )-----------( 507      ( 22 Jan 2021 07:36 )             37.5     01-22    138  |  104  |  23  ----------------------------<  85  4.1   |  21<L>  |  0.82    Ca    8.4      22 Jan 2021 07:36  Phos  3.1     01-21  Mg     2.3     01-22    TPro  6.4  /  Alb  2.9<L>  /  TBili  0.4  /  DBili  <0.2  /  AST  33  /  ALT  64<H>  /  AlkPhos  183<H>  01-22      COVID-19 PCR: Detected (20 Jan 2021 03:56)      RADIOLOGY & ADDITIONAL TESTS:  Imaging from Last 24 Hours:

## 2021-01-22 NOTE — PROGRESS NOTE ADULT - PROBLEM SELECTOR PLAN 3
- BP improved in recent readings.   - will continue to monitor, goal BP <150 given age, will hold off on starting BP meds at this time

## 2021-01-22 NOTE — PROGRESS NOTE ADULT - PROBLEM SELECTOR PLAN 1
- currently 94% on 2 lt NC  - decadron 6 mg IV daily x 10 days  - Remdesivir  x 5 days- last day 1/24  - monitor clinical response, respiratory status  - supplemental O2 prn, wean as tolerated  - tylenol prn

## 2021-01-23 LAB
ALBUMIN SERPL ELPH-MCNC: 2.8 G/DL — LOW (ref 3.3–5)
ALP SERPL-CCNC: 190 U/L — HIGH (ref 40–120)
ALT FLD-CCNC: 143 U/L — HIGH (ref 4–41)
AST SERPL-CCNC: 104 U/L — HIGH (ref 4–40)
BILIRUB DIRECT SERPL-MCNC: <0.2 MG/DL — SIGNIFICANT CHANGE UP (ref 0–0.2)
BILIRUB INDIRECT FLD-MCNC: >0.2 MG/DL — SIGNIFICANT CHANGE UP (ref 0–1)
BILIRUB SERPL-MCNC: 0.4 MG/DL — SIGNIFICANT CHANGE UP (ref 0.2–1.2)
CREAT SERPL-MCNC: 0.77 MG/DL — SIGNIFICANT CHANGE UP (ref 0.5–1.3)
PROT SERPL-MCNC: 6.5 G/DL — SIGNIFICANT CHANGE UP (ref 6–8.3)

## 2021-01-23 PROCEDURE — 99232 SBSQ HOSP IP/OBS MODERATE 35: CPT

## 2021-01-23 RX ADMIN — PANTOPRAZOLE SODIUM 40 MILLIGRAM(S): 20 TABLET, DELAYED RELEASE ORAL at 05:36

## 2021-01-23 RX ADMIN — Medication 6 MILLIGRAM(S): at 05:36

## 2021-01-23 RX ADMIN — ENOXAPARIN SODIUM 40 MILLIGRAM(S): 100 INJECTION SUBCUTANEOUS at 10:46

## 2021-01-23 RX ADMIN — Medication 81 MILLIGRAM(S): at 10:46

## 2021-01-23 RX ADMIN — REMDESIVIR 500 MILLIGRAM(S): 5 INJECTION INTRAVENOUS at 12:27

## 2021-01-23 NOTE — PROGRESS NOTE ADULT - PROBLEM SELECTOR PLAN 1
- currently 92%on 1 lt NC  - decadron 6 mg IV daily x 10 days  - Remdesivir  x 5 days- last day 1/24  - monitor clinical response, respiratory status  - supplemental O2 prn, wean as tolerated  - tylenol prn

## 2021-01-23 NOTE — PROGRESS NOTE ADULT - SUBJECTIVE AND OBJECTIVE BOX
Medicine Progress Note    Patient is a 70y old  Male who presents with a chief complaint of COVID19 (22 Jan 2021 12:43)      SUBJECTIVE / OVERNIGHT EVENTS:  Pt seen and examined at bedside.   Pt c/o mild SOB- saturating 90% on RA and 92% on 1lt NC.  Pt denies any other complaints.     ADDITIONAL REVIEW OF SYSTEMS: neg except above.     MEDICATIONS  (STANDING):  aspirin enteric coated 81 milliGRAM(s) Oral daily  dexAMETHasone  Injectable 6 milliGRAM(s) IV Push daily  enoxaparin Injectable 40 milliGRAM(s) SubCutaneous daily  influenza  Vaccine (HIGH DOSE) 0.7 milliLiter(s) IntraMuscular once  pantoprazole    Tablet 40 milliGRAM(s) Oral before breakfast  remdesivir  IVPB 100 milliGRAM(s) IV Intermittent every 24 hours  remdesivir  IVPB   IV Intermittent     MEDICATIONS  (PRN):  acetaminophen   Tablet .. 650 milliGRAM(s) Oral every 4 hours PRN Temp greater or equal to 38.5C (101.3F)  acetaminophen  Suppository .. 650 milliGRAM(s) Rectal every 4 hours PRN Temp greater or equal to 38.5C (101.3F)    CAPILLARY BLOOD GLUCOSE        I&O's Summary    22 Jan 2021 07:01  -  23 Jan 2021 07:00  --------------------------------------------------------  IN: 1800 mL / OUT: 1125 mL / NET: 675 mL    23 Jan 2021 07:01  -  23 Jan 2021 15:09  --------------------------------------------------------  IN: 0 mL / OUT: 400 mL / NET: -400 mL        PHYSICAL EXAM:  Vital Signs Last 24 Hrs  T(C): 36.6 (23 Jan 2021 11:19), Max: 36.6 (22 Jan 2021 21:26)  T(F): 97.9 (23 Jan 2021 11:19), Max: 97.9 (23 Jan 2021 05:18)  HR: 62 (23 Jan 2021 11:19) (57 - 63)  BP: 118/64 (23 Jan 2021 11:19) (118/64 - 128/79)  BP(mean): --  RR: 18 (23 Jan 2021 11:19) (18 - 18)  SpO2: 91% (23 Jan 2021 11:19) (91% - 95%)  CONSTITUTIONAL: NAD, well-developed, well-groomed  ENMT: Moist oral mucosa, no pharyngeal injection or exudates; normal dentition  RESPIRATORY: Normal respiratory effort; lungs are clear to auscultation bilaterally  CARDIOVASCULAR: Regular rate and rhythm, normal S1 and S2, no murmur/rub/gallop; No lower extremity edema; Peripheral pulses are 2+ bilaterally  ABDOMEN: Nontender to palpation, normoactive bowel sounds, no rebound/guarding; No hepatosplenomegaly  PSYCH: A+O to person, place, and time; affect appropriate  NEUROLOGY: CN 2-12 are intact and symmetric; no gross sensory deficits   SKIN: No rashes; no palpable lesions    LABS:                        12.6   11.72 )-----------( 507      ( 22 Jan 2021 07:36 )             37.5     01-23    x   |  x   |  x   ----------------------------<  x   x    |  x   |  0.77    Ca    8.4      22 Jan 2021 07:36  Mg     2.3     01-22    TPro  6.5  /  Alb  2.8<L>  /  TBili  0.4  /  DBili  <0.2  /  AST  104<H>  /  ALT  143<H>  /  AlkPhos  190<H>  01-23      COVID-19 PCR: Detected (20 Jan 2021 03:56)

## 2021-01-24 LAB
ALBUMIN SERPL ELPH-MCNC: 3.2 G/DL — LOW (ref 3.3–5)
ALP SERPL-CCNC: 183 U/L — HIGH (ref 40–120)
ALT FLD-CCNC: 156 U/L — HIGH (ref 4–41)
AST SERPL-CCNC: 68 U/L — HIGH (ref 4–40)
BILIRUB DIRECT SERPL-MCNC: <0.2 MG/DL — SIGNIFICANT CHANGE UP (ref 0–0.2)
BILIRUB INDIRECT FLD-MCNC: >0.2 MG/DL — SIGNIFICANT CHANGE UP (ref 0–1)
BILIRUB SERPL-MCNC: 0.4 MG/DL — SIGNIFICANT CHANGE UP (ref 0.2–1.2)
CREAT SERPL-MCNC: 0.82 MG/DL — SIGNIFICANT CHANGE UP (ref 0.5–1.3)
CRP SERPL-MCNC: 22.4 MG/L — HIGH
D DIMER BLD IA.RAPID-MCNC: 710 NG/ML DDU — HIGH
FERRITIN SERPL-MCNC: 840 NG/ML — HIGH (ref 30–400)
LDH SERPL L TO P-CCNC: 293 U/L — HIGH (ref 135–225)
PROT SERPL-MCNC: 6.4 G/DL — SIGNIFICANT CHANGE UP (ref 6–8.3)

## 2021-01-24 PROCEDURE — 99232 SBSQ HOSP IP/OBS MODERATE 35: CPT

## 2021-01-24 RX ADMIN — PANTOPRAZOLE SODIUM 40 MILLIGRAM(S): 20 TABLET, DELAYED RELEASE ORAL at 05:04

## 2021-01-24 RX ADMIN — Medication 81 MILLIGRAM(S): at 13:54

## 2021-01-24 RX ADMIN — Medication 6 MILLIGRAM(S): at 05:04

## 2021-01-24 RX ADMIN — REMDESIVIR 500 MILLIGRAM(S): 5 INJECTION INTRAVENOUS at 13:50

## 2021-01-24 RX ADMIN — ENOXAPARIN SODIUM 40 MILLIGRAM(S): 100 INJECTION SUBCUTANEOUS at 13:54

## 2021-01-24 NOTE — PROGRESS NOTE ADULT - PROBLEM SELECTOR PLAN 1
- Pt saturating 94% on RA at rest, desaturates to 86% on RA with ambulation.  Pt will need to be discharged on home O2.  - decadron 6 mg IV daily x 10 days  - Remdesivir  x 5 days- last day 1/24  - monitor clinical response, respiratory status  - supplemental O2 prn, wean as tolerated  - tylenol prn

## 2021-01-24 NOTE — PROGRESS NOTE ADULT - SUBJECTIVE AND OBJECTIVE BOX
Medicine Progress Note    Patient is a 70y old  Male who presents with a chief complaint of COVID19 (23 Jan 2021 15:07)    SUBJECTIVE / OVERNIGHT EVENTS:  Pt seen and examined at bedside.   Pt denies any complaints.   Pt saturating 94% on RA at rest, desaturates to 86% on RA with ambulation.  Pt will need to be discharged on home O2.    ADDITIONAL REVIEW OF SYSTEMS: neg except above.     MEDICATIONS  (STANDING):  aspirin enteric coated 81 milliGRAM(s) Oral daily  dexAMETHasone  Injectable 6 milliGRAM(s) IV Push daily  enoxaparin Injectable 40 milliGRAM(s) SubCutaneous daily  influenza  Vaccine (HIGH DOSE) 0.7 milliLiter(s) IntraMuscular once  pantoprazole    Tablet 40 milliGRAM(s) Oral before breakfast  remdesivir  IVPB 100 milliGRAM(s) IV Intermittent every 24 hours  remdesivir  IVPB   IV Intermittent     MEDICATIONS  (PRN):  acetaminophen   Tablet .. 650 milliGRAM(s) Oral every 4 hours PRN Temp greater or equal to 38.5C (101.3F)  acetaminophen  Suppository .. 650 milliGRAM(s) Rectal every 4 hours PRN Temp greater or equal to 38.5C (101.3F)    CAPILLARY BLOOD GLUCOSE        I&O's Summary    23 Jan 2021 07:01  -  24 Jan 2021 07:00  --------------------------------------------------------  IN: 0 mL / OUT: 850 mL / NET: -850 mL        PHYSICAL EXAM:  Vital Signs Last 24 Hrs  T(C): 36.6 (24 Jan 2021 08:57), Max: 36.6 (23 Jan 2021 22:00)  T(F): 97.8 (24 Jan 2021 08:57), Max: 97.8 (23 Jan 2021 22:00)  HR: 65 (24 Jan 2021 08:57) (65 - 66)  BP: 118/76 (24 Jan 2021 08:57) (118/76 - 122/71)  BP(mean): --  RR: 24 (24 Jan 2021 10:20) (18 - 24)  SpO2: 86% (24 Jan 2021 10:20) (86% - 94%)  CONSTITUTIONAL: NAD, well-developed, well-groomed  ENMT: Moist oral mucosa, no pharyngeal injection or exudates; normal dentition  RESPIRATORY: Normal respiratory effort; lungs are clear to auscultation bilaterally  CARDIOVASCULAR: Regular rate and rhythm, normal S1 and S2, no murmur/rub/gallop; No lower extremity edema; Peripheral pulses are 2+ bilaterally  ABDOMEN: Nontender to palpation, normoactive bowel sounds, no rebound/guarding; No hepatosplenomegaly  PSYCH: A+O to person, place, and time; affect appropriate  NEUROLOGY: CN 2-12 are intact and symmetric; no gross sensory deficits   SKIN: No rashes; no palpable lesions    LABS:    01-24    x   |  x   |  x   ----------------------------<  x   x    |  x   |  0.82      TPro  6.4  /  Alb  3.2<L>  /  TBili  0.4  /  DBili  <0.2  /  AST  68<H>  /  ALT  156<H>  /  AlkPhos  183<H>  01-24        COVID-19 PCR: Detected (20 Jan 2021 03:56)

## 2021-01-25 LAB
ALBUMIN SERPL ELPH-MCNC: 3.1 G/DL — LOW (ref 3.3–5)
ALP SERPL-CCNC: 175 U/L — HIGH (ref 40–120)
ALT FLD-CCNC: 138 U/L — HIGH (ref 4–41)
ANION GAP SERPL CALC-SCNC: 12 MMOL/L — SIGNIFICANT CHANGE UP (ref 7–14)
AST SERPL-CCNC: 48 U/L — HIGH (ref 4–40)
BILIRUB SERPL-MCNC: 0.4 MG/DL — SIGNIFICANT CHANGE UP (ref 0.2–1.2)
BUN SERPL-MCNC: 20 MG/DL — SIGNIFICANT CHANGE UP (ref 7–23)
CALCIUM SERPL-MCNC: 8.3 MG/DL — LOW (ref 8.4–10.5)
CHLORIDE SERPL-SCNC: 102 MMOL/L — SIGNIFICANT CHANGE UP (ref 98–107)
CO2 SERPL-SCNC: 23 MMOL/L — SIGNIFICANT CHANGE UP (ref 22–31)
CREAT SERPL-MCNC: 0.9 MG/DL — SIGNIFICANT CHANGE UP (ref 0.5–1.3)
GLUCOSE SERPL-MCNC: 77 MG/DL — SIGNIFICANT CHANGE UP (ref 70–99)
HCT VFR BLD CALC: 42.8 % — SIGNIFICANT CHANGE UP (ref 39–50)
HGB BLD-MCNC: 14.1 G/DL — SIGNIFICANT CHANGE UP (ref 13–17)
MAGNESIUM SERPL-MCNC: 1.8 MG/DL — SIGNIFICANT CHANGE UP (ref 1.6–2.6)
MCHC RBC-ENTMCNC: 31 PG — SIGNIFICANT CHANGE UP (ref 27–34)
MCHC RBC-ENTMCNC: 32.9 GM/DL — SIGNIFICANT CHANGE UP (ref 32–36)
MCV RBC AUTO: 94.1 FL — SIGNIFICANT CHANGE UP (ref 80–100)
NRBC # BLD: 0 /100 WBCS — SIGNIFICANT CHANGE UP
NRBC # FLD: 0 K/UL — SIGNIFICANT CHANGE UP
PLATELET # BLD AUTO: 630 K/UL — HIGH (ref 150–400)
POTASSIUM SERPL-MCNC: 4 MMOL/L — SIGNIFICANT CHANGE UP (ref 3.5–5.3)
POTASSIUM SERPL-SCNC: 4 MMOL/L — SIGNIFICANT CHANGE UP (ref 3.5–5.3)
PROT SERPL-MCNC: 6.5 G/DL — SIGNIFICANT CHANGE UP (ref 6–8.3)
RBC # BLD: 4.55 M/UL — SIGNIFICANT CHANGE UP (ref 4.2–5.8)
RBC # FLD: 13.8 % — SIGNIFICANT CHANGE UP (ref 10.3–14.5)
SODIUM SERPL-SCNC: 137 MMOL/L — SIGNIFICANT CHANGE UP (ref 135–145)
WBC # BLD: 10.4 K/UL — SIGNIFICANT CHANGE UP (ref 3.8–10.5)
WBC # FLD AUTO: 10.4 K/UL — SIGNIFICANT CHANGE UP (ref 3.8–10.5)

## 2021-01-25 PROCEDURE — 99233 SBSQ HOSP IP/OBS HIGH 50: CPT

## 2021-01-25 RX ADMIN — Medication 81 MILLIGRAM(S): at 11:42

## 2021-01-25 RX ADMIN — PANTOPRAZOLE SODIUM 40 MILLIGRAM(S): 20 TABLET, DELAYED RELEASE ORAL at 05:13

## 2021-01-25 RX ADMIN — Medication 6 MILLIGRAM(S): at 05:13

## 2021-01-25 RX ADMIN — ENOXAPARIN SODIUM 40 MILLIGRAM(S): 100 INJECTION SUBCUTANEOUS at 11:42

## 2021-01-25 NOTE — PROGRESS NOTE ADULT - PROBLEM SELECTOR PLAN 3
- BP improved in recent readings.   - will continue to monitor, goal BP <150 given age, will hold off on starting BP meds at this time - BP improved in recent readings.  - will continue to monitor, goal BP <150 given age, will hold off on starting BP meds at this time

## 2021-01-25 NOTE — DISCHARGE NOTE PROVIDER - NSDCCPCAREPLAN_GEN_ALL_CORE_FT
PRINCIPAL DISCHARGE DIAGNOSIS  Diagnosis: Acute respiratory failure due to COVID-19  Assessment and Plan of Treatment: Return to the ED for worsening or persistent symptoms or any other concerns.   Continue to take your medications as prescribed. Follow up with your doctor 1-2 days after discharge.      SECONDARY DISCHARGE DIAGNOSES  Diagnosis: COVID-19  Assessment and Plan of Treatment:

## 2021-01-25 NOTE — PROGRESS NOTE ADULT - SUBJECTIVE AND OBJECTIVE BOX
[facility] Division of Hospital Medicine  Adriel Fairchild MD  Pager [phone]  cell [phone]      Patient is a 70y old  Male who presents with a chief complaint of COVID19 (25 Jan 2021 11:40)      SUBJECTIVE / OVERNIGHT EVENTS:  ADDITIONAL REVIEW OF SYSTEMS:    MEDICATIONS  (STANDING):  aspirin enteric coated 81 milliGRAM(s) Oral daily  dexAMETHasone  Injectable 6 milliGRAM(s) IV Push daily  enoxaparin Injectable 40 milliGRAM(s) SubCutaneous daily  influenza  Vaccine (HIGH DOSE) 0.7 milliLiter(s) IntraMuscular once  pantoprazole    Tablet 40 milliGRAM(s) Oral before breakfast    MEDICATIONS  (PRN):  acetaminophen   Tablet .. 650 milliGRAM(s) Oral every 4 hours PRN Temp greater or equal to 38.5C (101.3F)  acetaminophen  Suppository .. 650 milliGRAM(s) Rectal every 4 hours PRN Temp greater or equal to 38.5C (101.3F)      CAPILLARY BLOOD GLUCOSE        I&O's Summary    24 Jan 2021 07:01  -  25 Jan 2021 07:00  --------------------------------------------------------  IN: 0 mL / OUT: 250 mL / NET: -250 mL        PHYSICAL EXAM:  Vital Signs Last 24 Hrs  T(C): 36.8 (25 Jan 2021 11:45), Max: 36.8 (25 Jan 2021 11:45)  T(F): 98.3 (25 Jan 2021 11:45), Max: 98.3 (25 Jan 2021 11:45)  HR: 68 (25 Jan 2021 11:45) (60 - 68)  BP: 113/71 (25 Jan 2021 11:45) (110/74 - 116/68)  BP(mean): --  RR: 18 (25 Jan 2021 11:45) (18 - 20)  SpO2: 95% (25 Jan 2021 11:45) (92% - 95%)  CONSTITUTIONAL: NAD, well-developed, well-groomed  EYES: PERRLA; conjunctiva and sclera clear  ENMT: Moist oral mucosa, no pharyngeal injection or exudates; normal dentition  NECK: Supple, no palpable masses; no thyromegaly  RESPIRATORY: Normal respiratory effort; lungs are clear to auscultation bilaterally  CARDIOVASCULAR: Regular rate and rhythm, normal S1 and S2, no murmur/rub/gallop; No lower extremity edema; Peripheral pulses are 2+ bilaterally  ABDOMEN: Nontender to palpation, normoactive bowel sounds, no rebound/guarding; No hepatosplenomegaly  MUSCULOSKELETAL:  Normal gait; no clubbing or cyanosis of digits; no joint swelling or tenderness to palpation  PSYCH: A+O to person, place, and time; affect appropriate  NEUROLOGY: CN 2-12 are intact and symmetric; no gross sensory deficits   SKIN: No rashes; no palpable lesions    LABS:                        14.1   10.40 )-----------( 630      ( 25 Jan 2021 07:14 )             42.8     01-25    137  |  102  |  20  ----------------------------<  77  4.0   |  23  |  0.90    Ca    8.3<L>      25 Jan 2021 07:14  Mg     1.8     01-25    TPro  6.5  /  Alb  3.1<L>  /  TBili  0.4  /  DBili  x   /  AST  48<H>  /  ALT  138<H>  /  AlkPhos  175<H>  01-25                RADIOLOGY & ADDITIONAL TESTS:  Results Reviewed:   Imaging Personally Reviewed:  Electrocardiogram Personally Reviewed:    COORDINATION OF CARE:  Care Discussed with Consultants/Other Providers [Y/N]:  Prior or Outpatient Records Reviewed [Y/N]:   [facility] Division of Hospital Medicine  Adriel Fairchild MD  Pager [phone]  cell [phone]      Patient is a 70y old  Male who presents with a chief complaint of COVID19 (25 Jan 2021 11:40). He requires home oxygen and is pending delivery of home oxygen before discharge.       SUBJECTIVE / OVERNIGHT EVENTS: No issues overnight. Currently 93% on RA but desaturating on ambulation. Not feeling tired or sob. Inflammatory markers are downtrending. Will need to continue steroids at home until 1/29 and will need home O2.   ADDITIONAL REVIEW OF SYSTEMS:    MEDICATIONS  (STANDING):  aspirin enteric coated 81 milliGRAM(s) Oral daily  dexAMETHasone  Injectable 6 milliGRAM(s) IV Push daily  enoxaparin Injectable 40 milliGRAM(s) SubCutaneous daily  influenza  Vaccine (HIGH DOSE) 0.7 milliLiter(s) IntraMuscular once  pantoprazole    Tablet 40 milliGRAM(s) Oral before breakfast    MEDICATIONS  (PRN):  acetaminophen   Tablet .. 650 milliGRAM(s) Oral every 4 hours PRN Temp greater or equal to 38.5C (101.3F)  acetaminophen  Suppository .. 650 milliGRAM(s) Rectal every 4 hours PRN Temp greater or equal to 38.5C (101.3F)      CAPILLARY BLOOD GLUCOSE        I&O's Summary    24 Jan 2021 07:01  -  25 Jan 2021 07:00  --------------------------------------------------------  IN: 0 mL / OUT: 250 mL / NET: -250 mL        PHYSICAL EXAM:  Vital Signs Last 24 Hrs  T(C): 36.8 (25 Jan 2021 11:45), Max: 36.8 (25 Jan 2021 11:45)  T(F): 98.3 (25 Jan 2021 11:45), Max: 98.3 (25 Jan 2021 11:45)  HR: 68 (25 Jan 2021 11:45) (60 - 68)  BP: 113/71 (25 Jan 2021 11:45) (110/74 - 116/68)  BP(mean): --  RR: 18 (25 Jan 2021 11:45) (18 - 20)  SpO2: 95% (25 Jan 2021 11:45) (92% - 95%)  CONSTITUTIONAL: NAD, thin, cachetic  EYES: PERRLA; conjunctiva and sclera clear  ENMT: Moist oral mucosa, no pharyngeal injection or exudates; normal dentition  NECK: Supple, no palpable masses; no thyromegaly  RESPIRATORY: Normal respiratory effort; lungs are clear to auscultation bilaterally  CARDIOVASCULAR: Regular rate and rhythm, normal S1 and S2, no murmur/rub/gallop; No lower extremity edema; Peripheral pulses are 2+ bilaterally  ABDOMEN: Nontender to palpation, normoactive bowel sounds, no rebound/guarding; No hepatosplenomegaly  MUSCULOSKELETAL:  Normal gait; no clubbing or cyanosis of digits; no joint swelling or tenderness to palpation  PSYCH: A+O to person, place, and time; affect appropriate  NEUROLOGY: CN 2-12 are intact and symmetric; no gross sensory deficits   SKIN: No rashes; no palpable lesions    LABS:                        14.1   10.40 )-----------( 630      ( 25 Jan 2021 07:14 )             42.8     01-25    137  |  102  |  20  ----------------------------<  77  4.0   |  23  |  0.90    Ca    8.3<L>      25 Jan 2021 07:14  Mg     1.8     01-25    TPro  6.5  /  Alb  3.1<L>  /  TBili  0.4  /  DBili  x   /  AST  48<H>  /  ALT  138<H>  /  AlkPhos  175<H>  01-25                RADIOLOGY & ADDITIONAL TESTS:  Results Reviewed:   Imaging Personally Reviewed:  Electrocardiogram Personally Reviewed:    COORDINATION OF CARE:  Care Discussed with Consultants/Other Providers [Y/N]:  Prior or Outpatient Records Reviewed [Y/N]:   [facility] Division of Hospital Medicine  Adriel Fairchild MD  Pager [phone]  cell [phone]      Patient is a 70y old  Male who presents with a chief complaint of COVID19 (25 Jan 2021 11:40). He requires home oxygen and is pending delivery of home oxygen before discharge.       SUBJECTIVE / OVERNIGHT EVENTS: No issues overnight. Currently 93% on RA but desaturating on ambulation. Not feeling tired or sob. Inflammatory markers are downtrending. Will need to continue steroids at home until 1/29 and will need home O2.     ADDITIONAL REVIEW OF SYSTEMS:    MEDICATIONS  (STANDING):  aspirin enteric coated 81 milliGRAM(s) Oral daily  dexAMETHasone  Injectable 6 milliGRAM(s) IV Push daily  enoxaparin Injectable 40 milliGRAM(s) SubCutaneous daily  influenza  Vaccine (HIGH DOSE) 0.7 milliLiter(s) IntraMuscular once  pantoprazole    Tablet 40 milliGRAM(s) Oral before breakfast    MEDICATIONS  (PRN):  acetaminophen   Tablet .. 650 milliGRAM(s) Oral every 4 hours PRN Temp greater or equal to 38.5C (101.3F)  acetaminophen  Suppository .. 650 milliGRAM(s) Rectal every 4 hours PRN Temp greater or equal to 38.5C (101.3F)      CAPILLARY BLOOD GLUCOSE        I&O's Summary    24 Jan 2021 07:01  -  25 Jan 2021 07:00  --------------------------------------------------------  IN: 0 mL / OUT: 250 mL / NET: -250 mL        PHYSICAL EXAM:  Vital Signs Last 24 Hrs  T(C): 36.8 (25 Jan 2021 11:45), Max: 36.8 (25 Jan 2021 11:45)  T(F): 98.3 (25 Jan 2021 11:45), Max: 98.3 (25 Jan 2021 11:45)  HR: 68 (25 Jan 2021 11:45) (60 - 68)  BP: 113/71 (25 Jan 2021 11:45) (110/74 - 116/68)  BP(mean): --  RR: 18 (25 Jan 2021 11:45) (18 - 20)  SpO2: 95% (25 Jan 2021 11:45) (92% - 95%)  CONSTITUTIONAL: NAD, thin, cachetic  EYES: PERRLA; conjunctiva and sclera clear  ENMT: Moist oral mucosa, no pharyngeal injection or exudates; normal dentition  NECK: Supple, no palpable masses; no thyromegaly  RESPIRATORY: Normal respiratory effort; lungs are clear to auscultation bilaterally  CARDIOVASCULAR: Regular rate and rhythm, normal S1 and S2, no murmur/rub/gallop; No lower extremity edema; Peripheral pulses are 2+ bilaterally  ABDOMEN: Nontender to palpation, normoactive bowel sounds, no rebound/guarding; No hepatosplenomegaly  MUSCULOSKELETAL:  Normal gait; no clubbing or cyanosis of digits; no joint swelling or tenderness to palpation  PSYCH: A+O to person, place, and time; affect appropriate  NEUROLOGY: ; no gross sensory deficits   SKIN: No rashes; no palpable lesions    LABS:                        14.1   10.40 )-----------( 630      ( 25 Jan 2021 07:14 )             42.8     01-25    137  |  102  |  20  ----------------------------<  77  4.0   |  23  |  0.90    Ca    8.3<L>      25 Jan 2021 07:14  Mg     1.8     01-25    TPro  6.5  /  Alb  3.1<L>  /  TBili  0.4  /  DBili  x   /  AST  48<H>  /  ALT  138<H>  /  AlkPhos  175<H>  01-25                RADIOLOGY & ADDITIONAL TESTS:  Results Reviewed:   Imaging Personally Reviewed:  Electrocardiogram Personally Reviewed:    COORDINATION OF CARE:  Care Discussed with Consultants/Other Providers [Y/N]:  Prior or Outpatient Records Reviewed [Y/N]:

## 2021-01-25 NOTE — PROGRESS NOTE ADULT - PROBLEM SELECTOR PLAN 2
- monitor LFTs on Remdesivir.  - AST/ALT- 68/156 - monitor LFTs on Remdesivir.  - AST/ALT- 68/156 > 48/138 (1/25/21)

## 2021-01-25 NOTE — PROGRESS NOTE ADULT - PROBLEM SELECTOR PLAN 1
- Pt saturating 94% on RA at rest, desaturates to 86% on RA with ambulation.  Pt will need to be discharged on home O2.  - decadron 6 mg IV daily x 10 days  - Remdesivir  x 5 days- last day 1/24  - monitor clinical response, respiratory status  - supplemental O2 prn, wean as tolerated  - tylenol prn - Pt saturating 93% on RA at rest, desaturates to 86% on RA with ambulation.  Pt will need to be discharged on home O2.  - decadron 6 mg IV daily x 10 days ending on 1/29  - Remdesivir  x 5 days (1/19-1/24  - monitor clinical response, respiratory status  - supplemental O2 prn, wean as tolerated  - tylenol prn

## 2021-01-25 NOTE — DISCHARGE NOTE PROVIDER - HOSPITAL COURSE
69 yo M w/ no significant medical/surgical history (described as healthy, only taking asa), presenting with worsening SOB x1 day, with fatigue and decreased PO intake/appetite x 5-6 days in setting of testing positive for COVID-19 on 1/10 (~10 days ago). Per daughter Kinjal, multiple family members including grandkids sick with Covid, but overall they are doing well (some lost sense of taste/smell). Patient was taking some Tylenol at home to help manage fever/aches.  Was noted to be hypoxic to the 80s at home.     Hospital Course (1/20-1/26):    Resp: Patient required supplemental oxygen while hospitalized due to COVID-19. Weaned to room air on 1/25, ambulating comfortably on RA. Received course of remdesivir and decadron. Pt had elevated LFTs which were monitored closely while patient was on remdesivir.     CVS: patient hypertensive in hospital, continued to monitor blood pressure, given patients age will defer starting anti hypertensives at this time if BP <150    Heme: Lovenox for DVT ppx    Discharge Physical:    Vital Signs Last 24 Hrs  T(C): 36.2 (26 Jan 2021 05:35), Max: 36.8 (25 Jan 2021 11:45)  T(F): 97.1 (26 Jan 2021 05:35), Max: 98.3 (25 Jan 2021 11:45)  HR: 68 (26 Jan 2021 05:35) (68 - 68)  BP: 113/77 (26 Jan 2021 05:35) (113/71 - 113/77)  BP(mean): --  RR: 18 (26 Jan 2021 05:35) (18 - 18)  SpO2: 89% (26 Jan 2021 10:50) (89% - 95%)    CONSTITUTIONAL: NAD, thin, cachetic  EYES: PERRLA; conjunctiva and sclera clear  ENMT: Moist oral mucosa, no pharyngeal injection or exudates; normal dentition  NECK: Supple, no palpable masses; no thyromegaly  RESPIRATORY: Normal respiratory effort; lungs are clear to auscultation bilaterally  CARDIOVASCULAR: Regular rate and rhythm, normal S1 and S2, no murmur/rub/gallop; No lower extremity edema; Peripheral pulses are 2+ bilaterally  ABDOMEN: Nontender to palpation, normoactive bowel sounds, no rebound/guarding; No hepatosplenomegaly  MUSCULOSKELETAL:  Normal gait; no clubbing or cyanosis of digits; no joint swelling or tenderness to palpation  PSYCH: A+O to person, place, and time; affect appropriate  NEUROLOGY: ; no gross sensory deficits   SKIN: No rashes; no palpable lesions

## 2021-01-26 VITALS — OXYGEN SATURATION: 95 % | RESPIRATION RATE: 16 BRPM

## 2021-01-26 LAB
ALBUMIN SERPL ELPH-MCNC: 3 G/DL — LOW (ref 3.3–5)
ALP SERPL-CCNC: 176 U/L — HIGH (ref 40–120)
ALT FLD-CCNC: 117 U/L — HIGH (ref 4–41)
ANION GAP SERPL CALC-SCNC: 12 MMOL/L — SIGNIFICANT CHANGE UP (ref 7–14)
AST SERPL-CCNC: 40 U/L — SIGNIFICANT CHANGE UP (ref 4–40)
BASOPHILS # BLD AUTO: 0 K/UL — SIGNIFICANT CHANGE UP (ref 0–0.2)
BASOPHILS NFR BLD AUTO: 0 % — SIGNIFICANT CHANGE UP (ref 0–2)
BILIRUB SERPL-MCNC: 0.4 MG/DL — SIGNIFICANT CHANGE UP (ref 0.2–1.2)
BUN SERPL-MCNC: 20 MG/DL — SIGNIFICANT CHANGE UP (ref 7–23)
CALCIUM SERPL-MCNC: 8.9 MG/DL — SIGNIFICANT CHANGE UP (ref 8.4–10.5)
CHLORIDE SERPL-SCNC: 101 MMOL/L — SIGNIFICANT CHANGE UP (ref 98–107)
CO2 SERPL-SCNC: 23 MMOL/L — SIGNIFICANT CHANGE UP (ref 22–31)
CREAT SERPL-MCNC: 0.85 MG/DL — SIGNIFICANT CHANGE UP (ref 0.5–1.3)
EOSINOPHIL # BLD AUTO: 0.1 K/UL — SIGNIFICANT CHANGE UP (ref 0–0.5)
EOSINOPHIL NFR BLD AUTO: 0.9 % — SIGNIFICANT CHANGE UP (ref 0–6)
GLUCOSE SERPL-MCNC: 86 MG/DL — SIGNIFICANT CHANGE UP (ref 70–99)
HCT VFR BLD CALC: 45.3 % — SIGNIFICANT CHANGE UP (ref 39–50)
HGB BLD-MCNC: 14.7 G/DL — SIGNIFICANT CHANGE UP (ref 13–17)
IANC: 7.55 K/UL — SIGNIFICANT CHANGE UP (ref 1.5–8.5)
LYMPHOCYTES # BLD AUTO: 1.16 K/UL — SIGNIFICANT CHANGE UP (ref 1–3.3)
LYMPHOCYTES # BLD AUTO: 10.8 % — LOW (ref 13–44)
MCHC RBC-ENTMCNC: 30.6 PG — SIGNIFICANT CHANGE UP (ref 27–34)
MCHC RBC-ENTMCNC: 32.5 GM/DL — SIGNIFICANT CHANGE UP (ref 32–36)
MCV RBC AUTO: 94.4 FL — SIGNIFICANT CHANGE UP (ref 80–100)
MONOCYTES # BLD AUTO: 0.29 K/UL — SIGNIFICANT CHANGE UP (ref 0–0.9)
MONOCYTES NFR BLD AUTO: 2.7 % — SIGNIFICANT CHANGE UP (ref 2–14)
NEUTROPHILS # BLD AUTO: 8.94 K/UL — HIGH (ref 1.8–7.4)
NEUTROPHILS NFR BLD AUTO: 82.9 % — HIGH (ref 43–77)
PLATELET # BLD AUTO: 641 K/UL — HIGH (ref 150–400)
POTASSIUM SERPL-MCNC: 4.2 MMOL/L — SIGNIFICANT CHANGE UP (ref 3.5–5.3)
POTASSIUM SERPL-SCNC: 4.2 MMOL/L — SIGNIFICANT CHANGE UP (ref 3.5–5.3)
PROT SERPL-MCNC: 6.9 G/DL — SIGNIFICANT CHANGE UP (ref 6–8.3)
RBC # BLD: 4.8 M/UL — SIGNIFICANT CHANGE UP (ref 4.2–5.8)
RBC # FLD: 13.7 % — SIGNIFICANT CHANGE UP (ref 10.3–14.5)
SODIUM SERPL-SCNC: 136 MMOL/L — SIGNIFICANT CHANGE UP (ref 135–145)
WBC # BLD: 10.78 K/UL — HIGH (ref 3.8–10.5)
WBC # FLD AUTO: 10.78 K/UL — HIGH (ref 3.8–10.5)

## 2021-01-26 PROCEDURE — 99239 HOSP IP/OBS DSCHRG MGMT >30: CPT

## 2021-01-26 RX ADMIN — Medication 6 MILLIGRAM(S): at 05:25

## 2021-01-26 RX ADMIN — ENOXAPARIN SODIUM 40 MILLIGRAM(S): 100 INJECTION SUBCUTANEOUS at 11:28

## 2021-01-26 RX ADMIN — Medication 81 MILLIGRAM(S): at 11:28

## 2021-01-26 RX ADMIN — PANTOPRAZOLE SODIUM 40 MILLIGRAM(S): 20 TABLET, DELAYED RELEASE ORAL at 06:56

## 2021-01-26 NOTE — DISCHARGE NOTE NURSING/CASE MANAGEMENT/SOCIAL WORK - PATIENT PORTAL LINK FT
You can access the FollowMyHealth Patient Portal offered by [facility] by registering at the following website: [email or web address]. By joining [facility]’s FollowMyHealth portal, you will also be able to view your health information using other applications (apps) compatible with our system.

## 2021-01-26 NOTE — PROGRESS NOTE ADULT - PROBLEM SELECTOR PLAN 4
DVT ppx: lovenox

## 2021-01-26 NOTE — PROGRESS NOTE ADULT - PROBLEM SELECTOR PROBLEM 3
Elevated blood pressure reading

## 2021-01-26 NOTE — DISCHARGE NOTE NURSING/CASE MANAGEMENT/SOCIAL WORK - NSDCPNINST_GEN_ALL_CORE
Patient is alert and orientedx4 able to make needs known. patient is being discharged home. Patient in no acute distress, oxygen saturation is 96% on room air. no acute distress or pain verbalized by patient.

## 2021-01-26 NOTE — PROGRESS NOTE ADULT - PROBLEM SELECTOR PROBLEM 1
Acute respiratory failure due to COVID-19

## 2021-01-26 NOTE — PROGRESS NOTE ADULT - SUBJECTIVE AND OBJECTIVE BOX
[facility] Division of Hospital Medicine  Adriel Fairchild MD  Pager [phone]  cell [phone]      Patient is a 70y old  Male who presents with a chief complaint of COVID19 (25 Jan 2021 11:40). He requires home oxygen and is pending delivery of home oxygen before discharge.       SUBJECTIVE / OVERNIGHT EVENTS: No issues overnight. Currently 95% on RA but desaturating on ambulation. Not feeling tired or sob. Inflammatory markers are downtrending. Will need to continue steroids at home until 1/29 and will need home O2.     ADDITIONAL REVIEW OF SYSTEMS:    MEDICATIONS  (STANDING):  aspirin enteric coated 81 milliGRAM(s) Oral daily  dexAMETHasone  Injectable 6 milliGRAM(s) IV Push daily  enoxaparin Injectable 40 milliGRAM(s) SubCutaneous daily  influenza  Vaccine (HIGH DOSE) 0.7 milliLiter(s) IntraMuscular once  pantoprazole    Tablet 40 milliGRAM(s) Oral before breakfast    MEDICATIONS  (PRN):  acetaminophen   Tablet .. 650 milliGRAM(s) Oral every 4 hours PRN Temp greater or equal to 38.5C (101.3F)  acetaminophen  Suppository .. 650 milliGRAM(s) Rectal every 4 hours PRN Temp greater or equal to 38.5C (101.3F)      CAPILLARY BLOOD GLUCOSE        I&O's Summary    24 Jan 2021 07:01  -  25 Jan 2021 07:00  --------------------------------------------------------  IN: 0 mL / OUT: 250 mL / NET: -250 mL        PHYSICAL EXAM:  Vital Signs Last 24 Hrs  T(C): 36.8 (25 Jan 2021 11:45), Max: 36.8 (25 Jan 2021 11:45)  T(F): 98.3 (25 Jan 2021 11:45), Max: 98.3 (25 Jan 2021 11:45)  HR: 68 (25 Jan 2021 11:45) (60 - 68)  BP: 113/71 (25 Jan 2021 11:45) (110/74 - 116/68)  BP(mean): --  RR: 18 (25 Jan 2021 11:45) (18 - 20)  SpO2: 95% (25 Jan 2021 11:45) (92% - 95%)  CONSTITUTIONAL: NAD, thin, cachetic  EYES: PERRLA; conjunctiva and sclera clear  ENMT: Moist oral mucosa, no pharyngeal injection or exudates; normal dentition  NECK: Supple, no palpable masses; no thyromegaly  RESPIRATORY: Normal respiratory effort; lungs are clear to auscultation bilaterally  CARDIOVASCULAR: Regular rate and rhythm, normal S1 and S2, no murmur/rub/gallop; No lower extremity edema; Peripheral pulses are 2+ bilaterally  ABDOMEN: Nontender to palpation, normoactive bowel sounds, no rebound/guarding; No hepatosplenomegaly  MUSCULOSKELETAL:  Normal gait; no clubbing or cyanosis of digits; no joint swelling or tenderness to palpation  PSYCH: A+O to person, place, and time; affect appropriate  NEUROLOGY: ; no gross sensory deficits   SKIN: No rashes; no palpable lesions    LABS:                        14.1   10.40 )-----------( 630      ( 25 Jan 2021 07:14 )             42.8     01-25    137  |  102  |  20  ----------------------------<  77  4.0   |  23  |  0.90    Ca    8.3<L>      25 Jan 2021 07:14  Mg     1.8     01-25    TPro  6.5  /  Alb  3.1<L>  /  TBili  0.4  /  DBili  x   /  AST  48<H>  /  ALT  138<H>  /  AlkPhos  175<H>  01-25                RADIOLOGY & ADDITIONAL TESTS:  Results Reviewed:   Imaging Personally Reviewed:  Electrocardiogram Personally Reviewed:    COORDINATION OF CARE:  Care Discussed with Consultants/Other Providers [Y/N]:  Prior or Outpatient Records Reviewed [Y/N]:

## 2021-01-26 NOTE — PROGRESS NOTE ADULT - PROBLEM SELECTOR PLAN 1
- Pt saturating 95% on RA at rest, desaturates to 86% on RA with ambulation.  Pt will need to be discharged on home O2.  - decadron 6 mg IV daily x 10 days ending on 1/29  - Remdesivir  x 5 days (1/19-1/24) completed  - monitor clinical response, respiratory status  - supplemental O2 prn, wean as tolerated  - tylenol prn

## 2021-01-26 NOTE — PROGRESS NOTE ADULT - ATTENDING COMMENTS
Adriel Fairchild M.D.  Division of [facility] Medicine  Office: [phone]
Adriel Fairchild M.D.  Division of [facility] Medicine  Office: [phone]

## 2021-01-26 NOTE — CHART NOTE - NSCHARTNOTEFT_GEN_A_CORE
For home oxygen needs see below:    Patient's oxygen saturation on room air while sitting:  Patient's oxygen saturation on room air while ambulating:  Patient's oxygen saturation on 2L/min of NC while ambulating:    Patient requires 2L/min NC oxygen for home therapy.    -Raz Zhang, PGY-3 For home oxygen needs see below:    Patient's oxygen saturation on room air while sittin%  Patient's oxygen saturation on room air while ambulatin%  Patient's oxygen saturation on 2L/min of NC while ambulatin%    Patient requires 2L/min NC oxygen for home therapy.    -Raz Zhang, PGY-3

## 2022-08-15 NOTE — PHYSICAL THERAPY INITIAL EVALUATION ADULT - ADDITIONAL COMMENTS
This patients INR is managed by the [facility]  Telephone: [phone]  Fax: [phone]  Pool: [phone] ( [facility])  ---------------------------------------------------------  Spoke to patients wife Aidee. INR results, dosing instructions, and follow up date provided. No health or medication changes verbalized. No questions or concerns at this time.    Patients last dose of Eliquis will be 2022.     Aidee asked if RN would call and set up lab draw for patient. RN explained that RN is unable to set up lab appointments for patients and that patient or Aidee needs to set up appointment as they can schedule proper time and location.     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 Patient desaturated to 89% after ambulating 50ft on room air. Patient returned to baseline with supine rest.

## 2024-12-13 NOTE — DIETITIAN INITIAL EVALUATION ADULT. - NS FNS REASON FOR WEIGHT CHANG
Left upper lobe pneumonia    Complete amoxicillin  Start azithromycin to cover for walking pneumonia (Mycoplasma)  Recheck if worsening and/or if fevers do not resolve by 72 hours into azithromycin  Probiotic/yogurt   decreased po intake

## 2025-07-29 NOTE — DISCHARGE NOTE PROVIDER - NSFTFHOMEHTHYNRD_GEN_ALL_CORE
Rx Refill Note  Requested Prescriptions     Signed Prescriptions Disp Refills    atorvastatin (LIPITOR) 40 MG tablet 90 tablet 3     Sig: TAKE 1 TABLET BY MOUTH EVERY NIGHT FOR HIGH AMOUNT OF FATS IN THE BLOOD     Authorizing Provider: CHRISTINA DICKINSON     Ordering User: LOLI MURRAY      Last office visit with prescribing clinician: 1/30/2025   Last telemedicine visit with prescribing clinician: Visit date not found   Next office visit with prescribing clinician: 7/31/2025                         Would you like a call back once the refill request has been completed: [] Yes [] No    If the office needs to give you a call back, can they leave a voicemail: [] Yes [] No    Loli Murray MA  07/29/25, 08:28 EDT   Yes